# Patient Record
Sex: FEMALE | URBAN - METROPOLITAN AREA
[De-identification: names, ages, dates, MRNs, and addresses within clinical notes are randomized per-mention and may not be internally consistent; named-entity substitution may affect disease eponyms.]

---

## 2017-08-03 ENCOUNTER — APPOINTMENT (RX ONLY)
Dept: URBAN - METROPOLITAN AREA CLINIC 52 | Facility: CLINIC | Age: 22
Setting detail: DERMATOLOGY
End: 2017-08-03

## 2017-08-03 DIAGNOSIS — L70.8 OTHER ACNE: ICD-10-CM | Status: UNCHANGED

## 2017-08-03 DIAGNOSIS — Z71.89 OTHER SPECIFIED COUNSELING: ICD-10-CM

## 2017-08-03 PROBLEM — L70.0 ACNE VULGARIS: Status: ACTIVE | Noted: 2017-08-03

## 2017-08-03 PROCEDURE — ? OTHER

## 2017-08-03 PROCEDURE — ? PRESCRIPTION

## 2017-08-03 PROCEDURE — 99213 OFFICE O/P EST LOW 20 MIN: CPT

## 2017-08-03 PROCEDURE — ? TCA CHEMICAL PEEL

## 2017-08-03 PROCEDURE — ? COUNSELING

## 2017-08-03 PROCEDURE — ? TREATMENT REGIMEN

## 2017-08-03 RX ORDER — CLINDAMYCIN PHOSPHATE 10 MG/ML
SOLUTION TOPICAL QAM
Qty: 1 | Refills: 2 | Status: ERX | COMMUNITY
Start: 2017-08-03

## 2017-08-03 RX ADMIN — CLINDAMYCIN PHOSPHATE: 10 SOLUTION TOPICAL at 23:39

## 2017-08-03 ASSESSMENT — LOCATION ZONE DERM
LOCATION ZONE: TRUNK
LOCATION ZONE: FACE

## 2017-08-03 ASSESSMENT — LOCATION SIMPLE DESCRIPTION DERM
LOCATION SIMPLE: INFERIOR FOREHEAD
LOCATION SIMPLE: CHEST

## 2017-08-03 ASSESSMENT — LOCATION DETAILED DESCRIPTION DERM
LOCATION DETAILED: INFERIOR MID FOREHEAD
LOCATION DETAILED: MIDDLE STERNUM

## 2017-08-03 NOTE — PROCEDURE: TCA CHEMICAL PEEL
Detail Level: Zone
Prep: The treated area was degreased with pre-peel cleanser, and vaseline was applied for protection of mucous membranes.
Post Peel Care: After the procedure, the treatment area was washed with soap and water, and a post-peel cream was applied. Sun protection and post-care instructions were reviewed with the patient.
Time (Mins): 0
Chemical Peel: 10% TCA
Post-Care Instructions: I reviewed with the patient in detail post-care instructions. Patient should avoid sun exposure and wear sun protection.
Number Of Coats: 4
Consent: Prior to the procedure, written consent was obtained and risks were reviewed, including but not limited to: redness, peeling, blistering, pigmentary change, scarring, infection, and pain.

## 2017-08-03 NOTE — PROCEDURE: TREATMENT REGIMEN
Initiate Treatment: Clindamycin 1% topical apply to face every morning
Discontinue Regimen: Spironolactone 25mg
Otc Regimen: Benzoyl peroxide
Detail Level: Detailed

## 2017-08-03 NOTE — PROCEDURE: OTHER
Other (Free Text): Spironolactone 25mg was not working for the patient it was also a Banned medication on the [employer] list for patients [employer] 1 softball league
Note Text (......Xxx Chief Complaint.): This diagnosis correlates with the
Detail Level: Zone

## 2023-02-09 ENCOUNTER — OFFICE VISIT (OUTPATIENT)
Dept: FAMILY MEDICINE CLINIC | Facility: CLINIC | Age: 28
End: 2023-02-09

## 2023-02-09 VITALS
DIASTOLIC BLOOD PRESSURE: 80 MMHG | WEIGHT: 166.2 LBS | BODY MASS INDEX: 25.19 KG/M2 | RESPIRATION RATE: 18 BRPM | OXYGEN SATURATION: 96 % | SYSTOLIC BLOOD PRESSURE: 136 MMHG | HEART RATE: 79 BPM | TEMPERATURE: 98.4 F | HEIGHT: 68 IN

## 2023-02-09 DIAGNOSIS — K21.9 GASTROESOPHAGEAL REFLUX DISEASE WITHOUT ESOPHAGITIS: Primary | ICD-10-CM

## 2023-02-09 RX ORDER — FAMOTIDINE 20 MG/1
20 TABLET, FILM COATED ORAL 2 TIMES DAILY
Qty: 180 TABLET | Refills: 0 | Status: SHIPPED | OUTPATIENT
Start: 2023-02-09

## 2023-02-09 NOTE — PROGRESS NOTES
Name: Robe Martínez      : 1995      MRN: [id number]  Encounter Provider: Tracey Lennox, MD  Encounter Date: 2023   Encounter department: [address] [place] [address]     1  Gastroesophageal reflux disease without esophagitis  -     famotidine (PEPCID) 20 mg tablet; Take 1 tablet (20 mg total) by mouth 2 (two) times a day       Counseled on diet  Start Famotidine for 2 months, than stop and see how you are feeling, if no further symptoms no need to continue it  Counseled on not restarting spicy foods for another few weeks   Suggest getting screening blood work and making an appointment for cervical screen   Subjective      23 yo female new patient to the office with complains of abdominal pain  Patient states has been having abdominal pain since before , about 2 5 months  She ws seen in urgent care and later in the ED, workup done including abdominal us and ct abdomen and pelvis were benign  Patient ws prescribed Carafate and Ondansetron with significant improvement of symptoms  Currently states pain has almost resolved, no further nausea  Has some mild pain and nausea if she eats late, which is worse when lying in bed   Dad diagnosed with UC, brother has Crohn's     Review of Systems   All other systems reviewed and are negative  No current outpatient medications on file prior to visit  Objective     /80 (BP Location: Left arm, Patient Position: Sitting, Cuff Size: Standard)   Pulse 79   Temp 98 4 °F (36 9 °C) (Temporal)   Resp 18   Ht 5' 8" (1 727 m)   Wt 75 4 kg (166 lb 3 2 oz)   LMP 2023   SpO2 96%   BMI 25 27 kg/m²     Physical Exam  Vitals and nursing note reviewed  Constitutional:       Appearance: She is well-developed  HENT:      Head: Normocephalic        Right Ear: External ear normal       Left Ear: External ear normal       Nose: Nose normal    Eyes:      Conjunctiva/sclera: Conjunctivae normal       Pupils: Pupils are equal, round, and reactive to light  Neck:      Thyroid: No thyromegaly  Cardiovascular:      Rate and Rhythm: Normal rate and regular rhythm  Heart sounds: Normal heart sounds  Pulmonary:      Effort: Pulmonary effort is normal       Breath sounds: Normal breath sounds  Abdominal:      Palpations: Abdomen is soft  Tenderness: There is no abdominal tenderness  There is no guarding or rebound  Musculoskeletal:         General: Normal range of motion  Cervical back: Normal range of motion and neck supple  Skin:     General: Skin is dry  Neurological:      Mental Status: She is alert and oriented to person, place, and time  Deep Tendon Reflexes: Reflexes are normal and symmetric         Sinai Meyers MD

## 2023-12-05 DIAGNOSIS — K21.9 GASTROESOPHAGEAL REFLUX DISEASE WITHOUT ESOPHAGITIS: ICD-10-CM

## 2023-12-05 RX ORDER — FAMOTIDINE 20 MG/1
20 TABLET, FILM COATED ORAL 2 TIMES DAILY
Qty: 180 TABLET | Refills: 0 | Status: SHIPPED | OUTPATIENT
Start: 2023-12-05

## 2024-07-03 DIAGNOSIS — K21.9 GASTROESOPHAGEAL REFLUX DISEASE WITHOUT ESOPHAGITIS: ICD-10-CM

## 2024-07-03 RX ORDER — FAMOTIDINE 20 MG/1
20 TABLET, FILM COATED ORAL 2 TIMES DAILY
Qty: 180 TABLET | Refills: 0 | OUTPATIENT
Start: 2024-07-03

## 2024-07-12 NOTE — TELEPHONE ENCOUNTER
third attempt to contact patient. left message to return my call on answering machine.    Letter being sent.

## 2024-08-02 ENCOUNTER — APPOINTMENT (OUTPATIENT)
Dept: LAB | Facility: MEDICAL CENTER | Age: 29
End: 2024-08-02
Payer: COMMERCIAL

## 2024-08-02 ENCOUNTER — APPOINTMENT (OUTPATIENT)
Dept: LAB | Facility: CLINIC | Age: 29
End: 2024-08-02
Payer: COMMERCIAL

## 2024-08-02 ENCOUNTER — OFFICE VISIT (OUTPATIENT)
Dept: FAMILY MEDICINE CLINIC | Facility: CLINIC | Age: 29
End: 2024-08-02

## 2024-08-02 VITALS
BODY MASS INDEX: 25.43 KG/M2 | OXYGEN SATURATION: 97 % | DIASTOLIC BLOOD PRESSURE: 78 MMHG | SYSTOLIC BLOOD PRESSURE: 102 MMHG | WEIGHT: 167.8 LBS | HEART RATE: 72 BPM | TEMPERATURE: 97.4 F | HEIGHT: 68 IN

## 2024-08-02 DIAGNOSIS — R19.7 DIARRHEA, UNSPECIFIED TYPE: ICD-10-CM

## 2024-08-02 DIAGNOSIS — R10.13 EPIGASTRIC PAIN: ICD-10-CM

## 2024-08-02 DIAGNOSIS — Z76.89 ENCOUNTER TO ESTABLISH CARE: Primary | ICD-10-CM

## 2024-08-02 DIAGNOSIS — Z11.3 SCREENING FOR STDS (SEXUALLY TRANSMITTED DISEASES): ICD-10-CM

## 2024-08-02 DIAGNOSIS — R14.0 BLOATING: ICD-10-CM

## 2024-08-02 LAB
ALBUMIN SERPL BCG-MCNC: 4.3 G/DL (ref 3.5–5)
ALP SERPL-CCNC: 54 U/L (ref 34–104)
ALT SERPL W P-5'-P-CCNC: 11 U/L (ref 7–52)
ANION GAP SERPL CALCULATED.3IONS-SCNC: 9 MMOL/L (ref 4–13)
AST SERPL W P-5'-P-CCNC: 15 U/L (ref 13–39)
BASOPHILS # BLD AUTO: 0.03 THOUSANDS/ÂΜL (ref 0–0.1)
BASOPHILS NFR BLD AUTO: 1 % (ref 0–1)
BILIRUB SERPL-MCNC: 1.39 MG/DL (ref 0.2–1)
BUN SERPL-MCNC: 8 MG/DL (ref 5–25)
CALCIUM SERPL-MCNC: 9.2 MG/DL (ref 8.4–10.2)
CHLORIDE SERPL-SCNC: 105 MMOL/L (ref 96–108)
CO2 SERPL-SCNC: 26 MMOL/L (ref 21–32)
CREAT SERPL-MCNC: 0.81 MG/DL (ref 0.6–1.3)
CRP SERPL QL: 1.4 MG/L
EOSINOPHIL # BLD AUTO: 0 THOUSAND/ÂΜL (ref 0–0.61)
EOSINOPHIL NFR BLD AUTO: 0 % (ref 0–6)
ERYTHROCYTE [DISTWIDTH] IN BLOOD BY AUTOMATED COUNT: 12.6 % (ref 11.6–15.1)
GFR SERPL CREATININE-BSD FRML MDRD: 100 ML/MIN/1.73SQ M
GLIADIN PEPTIDE IGA SER-ACNC: <0.2 U/ML
GLIADIN PEPTIDE IGA SER-ACNC: NEGATIVE
GLIADIN PEPTIDE IGG SER-ACNC: <0.4 U/ML
GLIADIN PEPTIDE IGG SER-ACNC: NEGATIVE
GLUCOSE P FAST SERPL-MCNC: 94 MG/DL (ref 65–99)
HCT VFR BLD AUTO: 42 % (ref 34.8–46.1)
HCV AB SER QL: NORMAL
HGB BLD-MCNC: 13.9 G/DL (ref 11.5–15.4)
HIV 1+2 AB+HIV1 P24 AG SERPL QL IA: NORMAL
HIV 2 AB SERPL QL IA: NORMAL
HIV1 AB SERPL QL IA: NORMAL
HIV1 P24 AG SERPL QL IA: NORMAL
IGA SERPL-MCNC: 109 MG/DL (ref 66–433)
IMM GRANULOCYTES # BLD AUTO: 0.02 THOUSAND/UL (ref 0–0.2)
IMM GRANULOCYTES NFR BLD AUTO: 1 % (ref 0–2)
LIPASE SERPL-CCNC: 16 U/L (ref 11–82)
LYMPHOCYTES # BLD AUTO: 1 THOUSANDS/ÂΜL (ref 0.6–4.47)
LYMPHOCYTES NFR BLD AUTO: 24 % (ref 14–44)
MCH RBC QN AUTO: 31.2 PG (ref 26.8–34.3)
MCHC RBC AUTO-ENTMCNC: 33.1 G/DL (ref 31.4–37.4)
MCV RBC AUTO: 94 FL (ref 82–98)
MONOCYTES # BLD AUTO: 0.25 THOUSAND/ÂΜL (ref 0.17–1.22)
MONOCYTES NFR BLD AUTO: 6 % (ref 4–12)
NEUTROPHILS # BLD AUTO: 2.89 THOUSANDS/ÂΜL (ref 1.85–7.62)
NEUTS SEG NFR BLD AUTO: 68 % (ref 43–75)
NRBC BLD AUTO-RTO: 0 /100 WBCS
PLATELET # BLD AUTO: 229 THOUSANDS/UL (ref 149–390)
PMV BLD AUTO: 12.4 FL (ref 8.9–12.7)
POTASSIUM SERPL-SCNC: 3.8 MMOL/L (ref 3.5–5.3)
PROT SERPL-MCNC: 6.8 G/DL (ref 6.4–8.4)
RBC # BLD AUTO: 4.46 MILLION/UL (ref 3.81–5.12)
SODIUM SERPL-SCNC: 140 MMOL/L (ref 135–147)
TREPONEMA PALLIDUM IGG+IGM AB [PRESENCE] IN SERUM OR PLASMA BY IMMUNOASSAY: NORMAL
TSH SERPL DL<=0.05 MIU/L-ACNC: 1.38 UIU/ML (ref 0.45–4.5)
TTG IGA SER-ACNC: <0.5 U/ML
TTG IGA SER-ACNC: NEGATIVE
TTG IGG SER-ACNC: <0.8 U/ML
TTG IGG SER-ACNC: NEGATIVE
WBC # BLD AUTO: 4.19 THOUSAND/UL (ref 4.31–10.16)

## 2024-08-02 PROCEDURE — 80053 COMPREHEN METABOLIC PANEL: CPT

## 2024-08-02 PROCEDURE — 86140 C-REACTIVE PROTEIN: CPT

## 2024-08-02 PROCEDURE — 86364 TISS TRNSGLTMNASE EA IG CLAS: CPT

## 2024-08-02 PROCEDURE — 85025 COMPLETE CBC W/AUTO DIFF WBC: CPT

## 2024-08-02 PROCEDURE — 87591 N.GONORRHOEAE DNA AMP PROB: CPT

## 2024-08-02 PROCEDURE — 36415 COLL VENOUS BLD VENIPUNCTURE: CPT

## 2024-08-02 PROCEDURE — 82784 ASSAY IGA/IGD/IGG/IGM EACH: CPT

## 2024-08-02 PROCEDURE — 86258 DGP ANTIBODY EACH IG CLASS: CPT

## 2024-08-02 PROCEDURE — 3725F SCREEN DEPRESSION PERFORMED: CPT | Performed by: FAMILY MEDICINE

## 2024-08-02 PROCEDURE — 87389 HIV-1 AG W/HIV-1&-2 AB AG IA: CPT

## 2024-08-02 PROCEDURE — 86780 TREPONEMA PALLIDUM: CPT

## 2024-08-02 PROCEDURE — 83690 ASSAY OF LIPASE: CPT

## 2024-08-02 PROCEDURE — 87491 CHLMYD TRACH DNA AMP PROBE: CPT

## 2024-08-02 PROCEDURE — 86803 HEPATITIS C AB TEST: CPT

## 2024-08-02 PROCEDURE — 99204 OFFICE O/P NEW MOD 45 MIN: CPT | Performed by: FAMILY MEDICINE

## 2024-08-02 PROCEDURE — 84443 ASSAY THYROID STIM HORMONE: CPT

## 2024-08-02 RX ORDER — PANTOPRAZOLE SODIUM 40 MG/1
40 TABLET, DELAYED RELEASE ORAL DAILY
Qty: 90 TABLET | Refills: 0 | Status: SHIPPED | OUTPATIENT
Start: 2024-08-02

## 2024-08-02 RX ORDER — SACCHAROMYCES BOULARDII 250 MG
250 CAPSULE ORAL 2 TIMES DAILY
COMMUNITY

## 2024-08-02 NOTE — PROGRESS NOTES
Assessment/Plan:    - Patient presenting with epigastric pain, reflux, bloating, diarrhea and unintentional weight loss. At this time will prescribe Protonix 40mg daily and patient will also avoid things that may worsen heartburn like tomato based products, caffeine, spicy foods. Will also obtain lab work to include repeat CBC, CMP, lipase, CRP, celiac disease testing and thyroid function tests as well as stool testing to include H Pylori, fecal calprotectin, giardia, bacterial panel and ova and parasites.   - Will screen for Gonorrhea, Chlamydia, HIV and Syphilis and hepatitis C. Patient counseled on safe sex practices    - Return to office in 6 weeks for pap smear        Return in about 6 weeks (around 9/13/2024) for pap smear .      There are no Patient Instructions on file for this visit.    Diagnoses and all orders for this visit:    Encounter to establish care    Epigastric pain  -     CBC and differential; Future  -     Comprehensive metabolic panel; Future  -     Lipase; Future  -     H. pylori antigen, stool; Future  -     C-reactive protein; Future  -     pantoprazole (PROTONIX) 40 mg tablet; Take 1 tablet (40 mg total) by mouth daily  -     Ambulatory Referral to Gastroenterology; Future    Diarrhea, unspecified type  -     H. pylori antigen, stool; Future  -     Calprotectin,Fecal; Future  -     Ambulatory Referral to Gastroenterology; Future  -     TSH, 3rd generation with Free T4 reflex; Future  -     Celiac Disease Panel; Future  -     Giardia antigen; Future  -     Stool Enteric Bacterial Panel by PCR; Future  -     Ova and parasite examination; Future  -     Clostridium difficile toxin by PCR with EIA; Future    Bloating    Screening for STDs (sexually transmitted diseases)  -     HIV 1/2 AG/AB w Reflex [facility] for 2 yr old and above; Future  -     Hepatitis C antibody; Future  -     Chlamydia/GC amplified DNA by PCR; Future  -     RPR (DX) W/REFL TITER AND CONFIRM TESTING (REFL); Future    Other  orders  -     saccharomyces boulardii (FLORASTOR) 250 mg capsule; Take 250 mg by mouth 2 (two) times a day          Subjective:       HPI    Yolanda Denise is a very pleasant 28 year old female who presents today for an encounter to establish care. Her main concern today is regarding GI issues. She states that 2 years ago she had an infection with H Pylori and ever since then she has had intermittent bouts of nausea, vomiting, abdominal pain and reflux. She states that she went to a wedding in [place] in July where ate prime rib and every since then she has been having these symptoms daily as well as diarrhea. She states that the diarrhea mainly occurs in the morning and she goes 3-4 times before things settle. She states that she has been taking Imodium to help with her symptoms. She denies any blood or mucus in the stools as well as nocturnal symptoms. She has also been taking over the counter pepcid which helps with the reflux symptoms at night but not so much in the morning. She states that she has also been avoiding red meat and tomato based products as well which worsen her symptoms. In addition to this she also reports fatigue and unintentional weight loss of 10lbs in the last month. Of note her family history is positive for Crohn's disease in her brother and Ulcerative colitis in her brother. Apart from that she otherwise feels well. She works as an  at [employer]. She denies any tobacco, alcohol or illicit drug use. She is sexually active with her boyfriend and uses condoms for protection.     Current Outpatient Medications on File Prior to Visit   Medication Sig Dispense Refill    famotidine (PEPCID) 20 mg tablet take 1 tablet by mouth twice a day 180 tablet 0    saccharomyces boulardii (FLORASTOR) 250 mg capsule Take 250 mg by mouth 2 (two) times a day       No current facility-administered medications on file prior to visit.     History reviewed. No pertinent past medical  history.  Past Surgical History:   Procedure Laterality Date    WISDOM TOOTH EXTRACTION       Social History     Socioeconomic History    Marital status: Single     Spouse name: None    Number of children: None    Years of education: None    Highest education level: None   Occupational History    None   Tobacco Use    Smoking status: Never    Smokeless tobacco: Never   Vaping Use    Vaping status: Never Used   Substance and Sexual Activity    Alcohol use: Not Currently    Drug use: Not Currently    Sexual activity: None   Other Topics Concern    None   Social History Narrative    None     Social Determinants of Health     Financial Resource Strain: Low Risk  (2/8/2023)    Overall Financial Resource Strain (CARDIA)     Difficulty of Paying Living Expenses: Not hard at all   Food Insecurity: No Food Insecurity (2/8/2023)    Hunger Vital Sign     Worried About Running Out of Food in the Last Year: Never true     Ran Out of Food in the Last Year: Never true   Transportation Needs: No Transportation Needs (2/8/2023)    PRAPARE - Transportation     Lack of Transportation (Medical): No     Lack of Transportation (Non-Medical): No   Physical Activity: Not on file   Stress: Not on file   Social Connections: Not on file   Intimate Partner Violence: Not on file   Housing Stability: Not on file     Family History   Problem Relation Age of Onset    Ulcerative colitis Father     Crohn's disease Brother     Diabetes Maternal Grandmother          Review of Systems   Constitutional:  Positive for unexpected weight change. Negative for fever.   HENT: Negative.     Respiratory: Negative.     Cardiovascular: Negative.    Gastrointestinal:  Positive for abdominal pain, diarrhea, nausea and vomiting. Negative for constipation.        Abdominal bloating   Genitourinary:  Positive for frequency. Negative for dysuria and hematuria.   Musculoskeletal:  Negative for arthralgias and myalgias.   Skin: Negative.    Neurological:  Positive for  "headaches.   Psychiatric/Behavioral: Negative.         Objective:    /78 (BP Location: Left arm, Patient Position: Sitting, Cuff Size: Adult)   Pulse 72   Temp (!) 97.4 °F (36.3 °C) (Temporal)   Ht 5' 8\" (1.727 m)   Wt 76.1 kg (167 lb 12.8 oz)   LMP 08/02/2024   SpO2 97%   BMI 25.51 kg/m²     Physical Exam  Constitutional:       General: She is not in acute distress.     Appearance: She is not ill-appearing.   HENT:      Head: Normocephalic and atraumatic.   Cardiovascular:      Rate and Rhythm: Normal rate.   Pulmonary:      Effort: Pulmonary effort is normal. No respiratory distress.      Breath sounds: No wheezing.   Abdominal:      General: Bowel sounds are normal.      Palpations: Abdomen is soft.      Tenderness: There is abdominal tenderness in the left upper quadrant.   Neurological:      General: No focal deficit present.      Mental Status: She is alert.   Psychiatric:         Mood and Affect: Mood normal.         Behavior: Behavior normal.         Desi Chirinos MD  08/02/24  8:55 AM  "

## 2024-08-05 LAB
C TRACH DNA SPEC QL NAA+PROBE: NEGATIVE
N GONORRHOEA DNA SPEC QL NAA+PROBE: NEGATIVE

## 2024-08-07 ENCOUNTER — APPOINTMENT (OUTPATIENT)
Dept: LAB | Facility: CLINIC | Age: 29
End: 2024-08-07
Payer: COMMERCIAL

## 2024-08-07 DIAGNOSIS — R19.7 DIARRHEA, UNSPECIFIED TYPE: ICD-10-CM

## 2024-08-07 DIAGNOSIS — R10.13 EPIGASTRIC PAIN: ICD-10-CM

## 2024-08-07 PROCEDURE — 83993 ASSAY FOR CALPROTECTIN FECAL: CPT

## 2024-08-07 PROCEDURE — 87177 OVA AND PARASITES SMEARS: CPT

## 2024-08-07 PROCEDURE — 87506 IADNA-DNA/RNA PROBE TQ 6-11: CPT

## 2024-08-07 PROCEDURE — 87209 SMEAR COMPLEX STAIN: CPT

## 2024-08-07 PROCEDURE — 87338 HPYLORI STOOL AG IA: CPT

## 2024-08-08 LAB
C COLI+JEJUNI TUF STL QL NAA+PROBE: NEGATIVE
C DIFF TOX GENS STL QL NAA+PROBE: NEGATIVE
EC STX1+STX2 GENES STL QL NAA+PROBE: NEGATIVE
G LAMBLIA AG STL QL IA: NEGATIVE
SALMONELLA SP SPAO STL QL NAA+PROBE: NEGATIVE
SHIGELLA SP+EIEC IPAH STL QL NAA+PROBE: NEGATIVE

## 2024-08-09 LAB
CALPROTECTIN STL-MCNC: 36.4 ÂΜG/G
H PYLORI AG STL QL IA: NEGATIVE

## 2024-08-19 DIAGNOSIS — R11.0 NAUSEA: Primary | ICD-10-CM

## 2024-08-20 RX ORDER — ONDANSETRON 4 MG/1
4 TABLET, FILM COATED ORAL EVERY 6 HOURS PRN
Qty: 30 TABLET | Refills: 1 | Status: SHIPPED | OUTPATIENT
Start: 2024-08-20

## 2024-09-10 NOTE — PROGRESS NOTES
[facility] - Outpatient Consultation Note  Yolanda Denise 28 y.o. female MRN: [id number]  Encounter: [id number]          ASSESSMENT AND PLAN:    I have personally reviewed the pertinent lab values and imaging reports.    Problem List Items Addressed This Visit    None  Visit Diagnoses       Epigastric pain    -  Primary    Relevant Orders    US right upper quadrant    EGD    Diarrhea, unspecified type        Relevant Orders    Colonoscopy    Weight loss, abnormal        Relevant Orders    EGD    Colonoscopy    Family history of Crohn's disease              Epigastric pain  Heartburn  Diarrhea  Unintentional weight loss  Intermittent episodes of epigastric pain and diarrhea, now with weight loss. FHx notable for brother with Crohn's and father with UC. Workup with PCP so far unremarkable ie GI PCR, C diff, ova and parasite, Giardia, TSH, Hgb, celiac panel, fecal calprotectin. Given ongoing symptoms with weight loss and strong FHx of IBD, recommend bidirectional endoscopy as below. Ddx includes PUD, H. pylori, esophagitis, gastritis, IBD, biliary colic, post-infections IBS, food sensitivity etc  - continue pantoprazole 40 mg daily before dinner; stop PPI at least 2 weeks prior to EGD  - schedule US RUQ to evaluate for biliary colic  - schedule EGD w/ HP bx (off of PPI)   - schedule ileocolonoscopy w/ MC bx  - avoid dairy products    RTC after procedures      ______________________________________________________________________    HPI:  28F with FHx of IBD (brother with Crohn's and father with UC) here for evaluation of episodic epigastric pain and diarrhea.     Referred by Dr. Chirinos re: epigastric pain and diarrhea    Prior workup in August 2024 notable for negative fecal calprotectin, GI PCR, ova and parasite, C diff, Giardia; TSH unremarkable, no anemia, celiac panel negative.    Her symptoms started about 2 years ago. Graduated from college, was part of [employer]1 softball team. Originally  from [place]. Now in [place], living with her boyfriend.   Diagnosed with H. Pylori about 2 years ago.   H. Pylori test which was done in August 2024 was possibly on PPI.  Brother with Crohns and father with UC. PGF with possibly with colon cancer.     Most recently she had bad flare up of her symptoms in the beginning of July. Had ribeye at a wedding in [place]. Then next day, had uncontrollable vomiting and diarrhea for about 2 hours. Also had epigastric pain which was dull aching. Pain lasted about several days. Pain not better or worse with defecation.   Also had another episode of diarrhea in the beginning of August. Not so much epigastric pain then .  Reports feeling nauseous intermittently and sometimes nausea wakes her up from sleep. No red meat since July. Avoids white sauce because this bothers her stomach as well. Had heartburn but under control with pantoprazole 40 mg daily before dinner which was started in August by her PCP. Sleeps better after being started on pantoprazole daily. Stops eating around 8 PM. Reports feeling acid coming up when burping though. No dysphagia or odynophagia. Lost about 10 lbs within the past 1.5 month because she has been avoiding certain food due to fear of having another episode of her symptoms.   Denies BRBPR, melena.  Her normal BM is nugget-like BM. No constipation. Has BM every other day.   Applesauce usually calms her abd usually.  No NSAID use.   Shx: negative toxic habits x3    REVIEW OF SYSTEMS (ROS):   All other systems were negative unless specified in HPI.     Historical Information   History reviewed. No pertinent past medical history.  Past Surgical History:   Procedure Laterality Date    WISDOM TOOTH EXTRACTION       Social History   Social History     Substance and Sexual Activity   Alcohol Use Not Currently     Social History     Substance and Sexual Activity   Drug Use Not Currently     Social History     Tobacco Use   Smoking Status Never   Smokeless  Tobacco Never     Family History   Problem Relation Age of Onset    Ulcerative colitis Father     Crohn's disease Brother     Diabetes Maternal Grandmother        Meds/Allergies       Current Outpatient Medications:     ondansetron (ZOFRAN) 4 mg tablet    pantoprazole (PROTONIX) 40 mg tablet    Probiotic Product (ALIGN PO)    No Known Allergies        Objective     Blood pressure 119/78, pulse 89, temperature 97.9 °F (36.6 °C), weight 73.7 kg (162 lb 6.4 oz). Body mass index is 24.69 kg/m².        PHYSICAL EXAM:      General Appearance:   Alert, cooperative, no distress   HEENT:   Normocephalic, atraumatic, anicteric.     Lungs:   Respirations unlabored    Heart::   Regular rate and rhythm   Abdomen:   Soft, non-tender, non-distended; no masses, no organomegaly    Rectal:   NA   Extremities:  No edema    Skin:  No jaundice, rashes, or lesions      Lab Results:    Result Date  Result Date   WBC 4.19 8/2/2024 Iron No results in last 5 years No results in last 5 years   Hgb 13.9 8/2/2024 TSAT No results in last 5 years No results in last 5 years   MCV 94 8/2/2024 Ferritin No results in last 5 years No results in last 5 years   Plt 229 8/2/2024 Vit D No results in last 5 years No results in last 5 years   Na 140 8/2/2024 HbA1c No results in last 5 years No results in last 5 years   K 3.8 8/2/2024 TSH 1.381 8/2/2024   Cr 0.81 8/2/2024 Ca 9.2 8/2/2024   AST 15 8/2/2024      ALT 11 8/2/2024      Alk phos 54 8/2/2024      Alb 4.3 8/2/2024      TBili 1.39 8/2/2024      DBili No results in last 5 years No results in last 5 years      GGT No results in last 5 years No results in last 5 years      INR No results in last 5 years No results in last 5 years                      Radiology Results:   US abd (under media) 12/2022: no gallstones  CT A/P without contrast (under media) 12/2022: no identified acute abdominal or pelvic abnormality    Endoscopy Reports:   none

## 2024-09-13 ENCOUNTER — CONSULT (OUTPATIENT)
Dept: GASTROENTEROLOGY | Facility: MEDICAL CENTER | Age: 29
End: 2024-09-13
Payer: COMMERCIAL

## 2024-09-13 ENCOUNTER — TELEPHONE (OUTPATIENT)
Dept: GASTROENTEROLOGY | Facility: MEDICAL CENTER | Age: 29
End: 2024-09-13

## 2024-09-13 VITALS
BODY MASS INDEX: 24.69 KG/M2 | WEIGHT: 162.4 LBS | DIASTOLIC BLOOD PRESSURE: 78 MMHG | HEART RATE: 89 BPM | TEMPERATURE: 97.9 F | SYSTOLIC BLOOD PRESSURE: 119 MMHG

## 2024-09-13 DIAGNOSIS — R63.4 WEIGHT LOSS, ABNORMAL: ICD-10-CM

## 2024-09-13 DIAGNOSIS — R10.13 EPIGASTRIC PAIN: Primary | ICD-10-CM

## 2024-09-13 DIAGNOSIS — R19.7 DIARRHEA, UNSPECIFIED TYPE: ICD-10-CM

## 2024-09-13 DIAGNOSIS — Z83.79 FAMILY HISTORY OF CROHN'S DISEASE: ICD-10-CM

## 2024-09-13 PROCEDURE — 99204 OFFICE O/P NEW MOD 45 MIN: CPT | Performed by: INTERNAL MEDICINE

## 2024-09-13 NOTE — TELEPHONE ENCOUNTER
Procedure: Colonoscopy/EGD  Date: 10/23/2024  Physician performing: Dr. Irene  Location of procedure:  [facility]  Instructions given to patient: Dul/Miralax   Diabetic: N/A  Clearances: N/A    Patient has scheduled follow up and will be place on the cancellation list

## 2024-09-17 ENCOUNTER — PROCEDURE VISIT (OUTPATIENT)
Dept: FAMILY MEDICINE CLINIC | Facility: CLINIC | Age: 29
End: 2024-09-17
Payer: COMMERCIAL

## 2024-09-17 VITALS
BODY MASS INDEX: 23.98 KG/M2 | WEIGHT: 158.2 LBS | OXYGEN SATURATION: 97 % | SYSTOLIC BLOOD PRESSURE: 100 MMHG | HEART RATE: 80 BPM | TEMPERATURE: 97.3 F | DIASTOLIC BLOOD PRESSURE: 64 MMHG | HEIGHT: 68 IN

## 2024-09-17 DIAGNOSIS — Z01.419 ENCOUNTER FOR GYNECOLOGICAL EXAMINATION: Primary | ICD-10-CM

## 2024-09-17 PROCEDURE — 99395 PREV VISIT EST AGE 18-39: CPT | Performed by: FAMILY MEDICINE

## 2024-09-17 PROCEDURE — G0476 HPV COMBO ASSAY CA SCREEN: HCPCS | Performed by: FAMILY MEDICINE

## 2024-09-17 PROCEDURE — G0145 SCR C/V CYTO,THINLAYER,RESCR: HCPCS | Performed by: FAMILY MEDICINE

## 2024-09-17 NOTE — PROGRESS NOTES
"SUBJECTIVE:   Yolanda Denise is a very pleasant 28 year old female who presents today for a gynecological examination   Current Outpatient Medications   Medication Sig Dispense Refill    ondansetron (ZOFRAN) 4 mg tablet Take 1 tablet (4 mg total) by mouth every 6 (six) hours as needed for nausea or vomiting 30 tablet 1    pantoprazole (PROTONIX) 40 mg tablet Take 1 tablet (40 mg total) by mouth daily 90 tablet 0    Probiotic Product (ALIGN PO) Take by mouth       No current facility-administered medications for this visit.     Allergies: Patient has no known allergies.   Patient's last menstrual period was 09/10/2024.    ROS:  Feeling well. No dyspnea or chest pain on exertion.  No abdominal pain, change in bowel habits, black or bloody stools.  No urinary tract symptoms. GYN ROS: normal menses, no abnormal bleeding, pelvic pain or discharge, no breast pain or new or enlarging lumps on self exam. No neurological complaints.    OBJECTIVE:   The patient appears well, alert, oriented x 3, in no distress.  /64 (BP Location: Left arm, Patient Position: Sitting, Cuff Size: Large)   Pulse 80   Temp (!) 97.3 °F (36.3 °C) (Temporal)   Ht 5' 8\" (1.727 m)   Wt 71.8 kg (158 lb 3.2 oz)   LMP 09/10/2024   SpO2 97%   BMI 24.05 kg/m²   ENT normal.  Neck supple. No adenopathy or thyromegaly. GAB. Lungs are clear, good air entry, no wheezes, rhonchi or rales. S1 and S2 normal, no murmurs, regular rate and rhythm. Abdomen soft without tenderness, guarding, mass or organomegaly. Extremities show no edema, normal peripheral pulses. Neurological is normal, no focal findings.    BREAST EXAM: breasts appear normal, no suspicious masses, no skin or nipple changes or axillary nodes    PELVIC EXAM: normal external genitalia, vulva, vagina, cervix, uterus and adnexa    ASSESSMENT AND PLAN:  Pap smear performed today today's office visit   Continue to follow up with GI as scheduled  Follow up with me in one year for annual " physical or sooner if needed

## 2024-09-18 LAB
HPV HR 12 DNA CVX QL NAA+PROBE: NEGATIVE
HPV16 DNA CVX QL NAA+PROBE: NEGATIVE
HPV18 DNA CVX QL NAA+PROBE: NEGATIVE

## 2024-09-20 LAB
LAB AP GYN PRIMARY INTERPRETATION: NORMAL
LAB AP LMP: NORMAL
Lab: NORMAL

## 2024-10-10 ENCOUNTER — TELEPHONE (OUTPATIENT)
Dept: GASTROENTEROLOGY | Facility: MEDICAL CENTER | Age: 29
End: 2024-10-10

## 2024-10-11 ENCOUNTER — TELEPHONE (OUTPATIENT)
Dept: GASTROENTEROLOGY | Facility: MEDICAL CENTER | Age: 29
End: 2024-10-11

## 2024-10-15 ENCOUNTER — ANESTHESIA (OUTPATIENT)
Dept: ANESTHESIOLOGY | Facility: HOSPITAL | Age: 29
End: 2024-10-15

## 2024-10-15 ENCOUNTER — ANESTHESIA EVENT (OUTPATIENT)
Dept: ANESTHESIOLOGY | Facility: HOSPITAL | Age: 29
End: 2024-10-15

## 2024-10-23 ENCOUNTER — ANESTHESIA (OUTPATIENT)
Dept: ANESTHESIOLOGY | Facility: HOSPITAL | Age: 29
End: 2024-10-23

## 2024-10-23 ENCOUNTER — HOSPITAL ENCOUNTER (OUTPATIENT)
Dept: GASTROENTEROLOGY | Facility: MEDICAL CENTER | Age: 29
Setting detail: OUTPATIENT SURGERY
Discharge: HOME | End: 2024-10-23

## 2024-10-23 ENCOUNTER — TELEPHONE (OUTPATIENT)
Age: 29
End: 2024-10-23

## 2024-10-23 ENCOUNTER — ANESTHESIA EVENT (OUTPATIENT)
Dept: ANESTHESIOLOGY | Facility: HOSPITAL | Age: 29
End: 2024-10-23

## 2024-10-23 DIAGNOSIS — R10.13 EPIGASTRIC PAIN: ICD-10-CM

## 2024-10-23 DIAGNOSIS — R19.7 DIARRHEA, UNSPECIFIED TYPE: ICD-10-CM

## 2024-10-23 DIAGNOSIS — R63.4 WEIGHT LOSS, ABNORMAL: ICD-10-CM

## 2024-10-23 PROBLEM — K21.9 GASTROESOPHAGEAL REFLUX DISEASE: Status: ACTIVE | Noted: 2024-10-23

## 2024-10-23 NOTE — TELEPHONE ENCOUNTER
----- Message from Jones Irene MD sent at 10/23/2024  7:40 AM EDT -----  Regarding: reschedule her follow up appointment  Hi team,    Could you move down her follow up appointment to ~1 yr from now please? Patient is aware. Thanks!

## 2024-10-23 NOTE — QUICK NOTE
Positive pregnancy test. Procedures cancelled.     Her GI symptoms are better (able to eat red meat without issues etc) and she is not losing anymore weight. Currently off of PPI. If heartburn, recommend famotidine prn. She has been taking Zofran prn. Last taken last week. Recommend holding off on Zofran for now until seen by OB. Given her abdominal pain resolved, will cancel US abd. Follow up with me in about a year.

## 2024-10-28 ENCOUNTER — TELEPHONE (OUTPATIENT)
Age: 29
End: 2024-10-28

## 2024-10-28 NOTE — TELEPHONE ENCOUNTER
Patient called to request a lab order for blood work to check for pregnancy.  She stated during her periop appt, she had a positive urine pregnancy test.  She then went home and took home tests, which were negative.  She would like to have a blood test to be sure.    Please send her a message via [employer] to advise.

## 2024-10-29 DIAGNOSIS — Z32.01 POSITIVE PREGNANCY TEST: Primary | ICD-10-CM

## 2024-11-05 ENCOUNTER — APPOINTMENT (OUTPATIENT)
Dept: LAB | Facility: CLINIC | Age: 29
End: 2024-11-05
Payer: COMMERCIAL

## 2024-11-05 DIAGNOSIS — Z32.01 POSITIVE PREGNANCY TEST: ICD-10-CM

## 2024-11-05 LAB — B-HCG SERPL-ACNC: <0.6 MIU/ML (ref 0–5)

## 2024-11-05 PROCEDURE — 36415 COLL VENOUS BLD VENIPUNCTURE: CPT

## 2024-11-05 PROCEDURE — 84702 CHORIONIC GONADOTROPIN TEST: CPT
